# Patient Record
Sex: FEMALE | Race: WHITE | NOT HISPANIC OR LATINO | Employment: FULL TIME | URBAN - NONMETROPOLITAN AREA
[De-identification: names, ages, dates, MRNs, and addresses within clinical notes are randomized per-mention and may not be internally consistent; named-entity substitution may affect disease eponyms.]

---

## 2022-03-16 ENCOUNTER — OFFICE VISIT (OUTPATIENT)
Dept: FAMILY MEDICINE | Facility: CLINIC | Age: 42
End: 2022-03-16
Payer: COMMERCIAL

## 2022-03-16 VITALS
WEIGHT: 208 LBS | TEMPERATURE: 98 F | RESPIRATION RATE: 20 BRPM | OXYGEN SATURATION: 98 % | SYSTOLIC BLOOD PRESSURE: 120 MMHG | DIASTOLIC BLOOD PRESSURE: 87 MMHG | HEIGHT: 67 IN | BODY MASS INDEX: 32.65 KG/M2 | HEART RATE: 71 BPM

## 2022-03-16 DIAGNOSIS — H66.92 LEFT OTITIS MEDIA, UNSPECIFIED OTITIS MEDIA TYPE: ICD-10-CM

## 2022-03-16 DIAGNOSIS — R09.81 HEAD CONGESTION: ICD-10-CM

## 2022-03-16 DIAGNOSIS — B37.9 CANDIDA ALBICANS INFECTION: ICD-10-CM

## 2022-03-16 DIAGNOSIS — J32.1 FRONTAL SINUSITIS, UNSPECIFIED CHRONICITY: Primary | ICD-10-CM

## 2022-03-16 DIAGNOSIS — H93.8X3 CONGESTION OF BOTH EARS: ICD-10-CM

## 2022-03-16 PROCEDURE — 3074F PR MOST RECENT SYSTOLIC BLOOD PRESSURE < 130 MM HG: ICD-10-PCS | Mod: ,,, | Performed by: NURSE PRACTITIONER

## 2022-03-16 PROCEDURE — 1160F PR REVIEW ALL MEDS BY PRESCRIBER/CLIN PHARMACIST DOCUMENTED: ICD-10-PCS | Mod: ,,, | Performed by: NURSE PRACTITIONER

## 2022-03-16 PROCEDURE — 3074F SYST BP LT 130 MM HG: CPT | Mod: ,,, | Performed by: NURSE PRACTITIONER

## 2022-03-16 PROCEDURE — 3008F BODY MASS INDEX DOCD: CPT | Mod: ,,, | Performed by: NURSE PRACTITIONER

## 2022-03-16 PROCEDURE — 96372 THER/PROPH/DIAG INJ SC/IM: CPT | Mod: ,,, | Performed by: NURSE PRACTITIONER

## 2022-03-16 PROCEDURE — 96372 PR INJECTION,THERAP/PROPH/DIAG2ST, IM OR SUBCUT: ICD-10-PCS | Mod: ,,, | Performed by: NURSE PRACTITIONER

## 2022-03-16 PROCEDURE — 1160F RVW MEDS BY RX/DR IN RCRD: CPT | Mod: ,,, | Performed by: NURSE PRACTITIONER

## 2022-03-16 PROCEDURE — 3079F DIAST BP 80-89 MM HG: CPT | Mod: ,,, | Performed by: NURSE PRACTITIONER

## 2022-03-16 PROCEDURE — 1159F PR MEDICATION LIST DOCUMENTED IN MEDICAL RECORD: ICD-10-PCS | Mod: ,,, | Performed by: NURSE PRACTITIONER

## 2022-03-16 PROCEDURE — 1159F MED LIST DOCD IN RCRD: CPT | Mod: ,,, | Performed by: NURSE PRACTITIONER

## 2022-03-16 PROCEDURE — 99213 OFFICE O/P EST LOW 20 MIN: CPT | Mod: 25,,, | Performed by: NURSE PRACTITIONER

## 2022-03-16 PROCEDURE — 99213 PR OFFICE/OUTPT VISIT, EST, LEVL III, 20-29 MIN: ICD-10-PCS | Mod: 25,,, | Performed by: NURSE PRACTITIONER

## 2022-03-16 PROCEDURE — 3079F PR MOST RECENT DIASTOLIC BLOOD PRESSURE 80-89 MM HG: ICD-10-PCS | Mod: ,,, | Performed by: NURSE PRACTITIONER

## 2022-03-16 PROCEDURE — 3008F PR BODY MASS INDEX (BMI) DOCUMENTED: ICD-10-PCS | Mod: ,,, | Performed by: NURSE PRACTITIONER

## 2022-03-16 RX ORDER — PROMETHAZINE HYDROCHLORIDE 25 MG/1
25 TABLET ORAL
COMMUNITY
Start: 2021-10-15 | End: 2023-07-20

## 2022-03-16 RX ORDER — NYSTATIN 100000 [USP'U]/ML
6 SUSPENSION ORAL
Qty: 473 ML | Refills: 0 | Status: SHIPPED | OUTPATIENT
Start: 2022-03-16 | End: 2022-03-26

## 2022-03-16 RX ORDER — DEXAMETHASONE SODIUM PHOSPHATE 4 MG/ML
4 INJECTION, SOLUTION INTRA-ARTICULAR; INTRALESIONAL; INTRAMUSCULAR; INTRAVENOUS; SOFT TISSUE
Status: COMPLETED | OUTPATIENT
Start: 2022-03-16 | End: 2022-03-16

## 2022-03-16 RX ORDER — CYANOCOBALAMIN 1000 UG/ML
1000 INJECTION, SOLUTION INTRAMUSCULAR; SUBCUTANEOUS
COMMUNITY
Start: 2022-03-04 | End: 2023-07-20

## 2022-03-16 RX ORDER — ESTRADIOL 0.1 MG/D
1 FILM, EXTENDED RELEASE TRANSDERMAL
COMMUNITY
Start: 2022-03-11

## 2022-03-16 RX ORDER — CEFTRIAXONE 1 G/1
1 INJECTION, POWDER, FOR SOLUTION INTRAMUSCULAR; INTRAVENOUS
Status: COMPLETED | OUTPATIENT
Start: 2022-03-16 | End: 2022-03-16

## 2022-03-16 RX ORDER — PANTOPRAZOLE SODIUM 40 MG/1
40 TABLET, DELAYED RELEASE ORAL 2 TIMES DAILY
COMMUNITY
Start: 2022-03-11 | End: 2023-10-26 | Stop reason: SDUPTHER

## 2022-03-16 RX ORDER — CEFDINIR 300 MG/1
300 CAPSULE ORAL 2 TIMES DAILY
Qty: 20 CAPSULE | Refills: 0 | Status: SHIPPED | OUTPATIENT
Start: 2022-03-16 | End: 2022-03-26

## 2022-03-16 RX ADMIN — DEXAMETHASONE SODIUM PHOSPHATE 4 MG: 4 INJECTION, SOLUTION INTRA-ARTICULAR; INTRALESIONAL; INTRAMUSCULAR; INTRAVENOUS; SOFT TISSUE at 11:03

## 2022-03-16 RX ADMIN — CEFTRIAXONE 1 G: 1 INJECTION, POWDER, FOR SOLUTION INTRAMUSCULAR; INTRAVENOUS at 11:03

## 2022-03-16 NOTE — LETTER
March 16, 2022      [address]  [place] [address]  Phone: [phone]  Fax: [phone]       Patient: Suzan Hartman   YOB: 1980  Date of Visit: 03/16/2022    To Whom It May Concern:    CHRISTIE Hartman  was at [facility] on 03/16/2022. The patient may return to work/school on 03/17/2022 with no restrictions. If you have any questions or concerns, or if I can be of further assistance, please do not hesitate to contact me.    Sincerely,    KEI Amaro

## 2022-03-21 NOTE — PROGRESS NOTES
KEI Strange   [facility]  [address] [address] [address]  [phone]      PATIENT NAME: Suzan Hartman  : 1980  DATE: 3/16/22  MRN: [id number]      Billing Provider: KEI Strange  Level of Service:   Patient PCP Information     Provider PCP Type    KEI Strange General          Reason for Visit / Chief Complaint: Sinus Problem, Nasal Congestion, and Mouth Lesions (Been about a week)       Update PCP  Update Chief Complaint         History of Present Illness / Problem Focused Workflow       41 year old female presents with complaints of head and nasal congestion, sinus pressure, and a mouth lesion for about a week  Denies any fever, n/v    Review of Systems     Review of Systems   Constitutional: Negative for activity change and unexpected weight change.   HENT: Positive for rhinorrhea. Negative for hearing loss and trouble swallowing.    Eyes: Negative for discharge and visual disturbance.   Respiratory: Negative for chest tightness and wheezing.    Cardiovascular: Positive for palpitations. Negative for chest pain.   Gastrointestinal: Negative for constipation, diarrhea and vomiting.   Endocrine: Negative for polydipsia and polyuria.   Genitourinary: Negative for difficulty urinating, dysuria, hematuria and menstrual problem.   Musculoskeletal: Negative for arthralgias, joint swelling and neck pain.   Neurological: Negative for weakness and headaches.   Psychiatric/Behavioral: Negative for confusion and dysphoric mood.       Medical / Social / Family History   No past medical history on file.    No past surgical history on file.    Social History  MsLeda  reports that she has never smoked. She has never used smokeless tobacco. She reports that she does not drink alcohol and does not use drugs.    Family History  Ms.'s family history is not on file.    Medications and Allergies     Medications  Outpatient Medications Marked as Taking for  the 3/16/22 encounter (Office Visit) with KEI Strange   Medication Sig Dispense Refill    cyanocobalamin 1,000 mcg/mL injection Inject 1,000 mcg into the muscle every 30 days.      estradioL (VIVELLE-DOT) 0.1 mg/24 hr PTSW Place 1 patch onto the skin twice a week.      pantoprazole (PROTONIX) 40 MG tablet Take 40 mg by mouth 2 (two) times daily.      promethazine (PHENERGAN) 25 MG tablet Take 25 mg by mouth as needed.         Allergies  Review of patient's allergies indicates:   Allergen Reactions    Miralax [polyethylene glycol 3350]        Physical Examination     Vitals:    03/16/22 1110   BP: 120/87   Pulse: 71   Resp: 20   Temp: 97.7 °F (36.5 °C)     Physical Exam  Constitutional:       General: She is not in acute distress.  HENT:      Head: Normocephalic.      Ears:      Comments: Redness to bilat inner ear     Nose: Congestion present.      Mouth/Throat:      Mouth: Mucous membranes are moist.      Pharynx: Posterior oropharyngeal erythema present.   Eyes:      General:         Right eye: No discharge.         Left eye: No discharge.      Extraocular Movements: Extraocular movements intact.   Cardiovascular:      Rate and Rhythm: Normal rate.   Pulmonary:      Effort: Pulmonary effort is normal. No respiratory distress.   Abdominal:      General: Bowel sounds are normal.      Palpations: Abdomen is soft.   Musculoskeletal:         General: Normal range of motion.      Cervical back: Normal range of motion.   Skin:     General: Skin is warm.   Neurological:      Mental Status: She is alert and oriented to person, place, and time.   Psychiatric:         Behavior: Behavior normal.           Imaging / Labs     No visits with results within 1 Day(s) from this visit.   Latest known visit with results is:   No results found for any previous visit.     No image results found.      Assessment and Plan (including Health Maintenance)      Problem List  Smart Sets  Document Outside HM   :    Health  Maintenance Due   Topic Date Due    Hepatitis C Screening  Never done    Cervical Cancer Screening  Never done    Lipid Panel  Never done    HIV Screening  Never done    TETANUS VACCINE  Never done    Mammogram  Never done       Problem List Items Addressed This Visit        ENT    Frontal sinusitis - Primary    Relevant Medications    cefdinir (OMNICEF) 300 MG capsule    Left otitis media    Relevant Medications    cefdinir (OMNICEF) 300 MG capsule       ID    Candida albicans infection    Relevant Medications    nystatin (MYCOSTATIN) 100,000 unit/mL suspension      Other Visit Diagnoses     Congestion of both ears        Relevant Medications    dexamethasone injection 4 mg (Completed)    Head congestion        Relevant Medications    dexamethasone injection 4 mg (Completed)        Treat for sinusitis today. Nystatin s/s for mouth sore  Follow up if symptoms do not improve within 72 hours  Pt voices understanding and agreement    Signature:  KEI Strange  [address] [address]  [phone]    Date of encounter: 3/16/22

## 2022-04-18 ENCOUNTER — OFFICE VISIT (OUTPATIENT)
Dept: FAMILY MEDICINE | Facility: CLINIC | Age: 42
End: 2022-04-18
Payer: COMMERCIAL

## 2022-04-18 VITALS
BODY MASS INDEX: 32.96 KG/M2 | DIASTOLIC BLOOD PRESSURE: 70 MMHG | RESPIRATION RATE: 18 BRPM | HEART RATE: 88 BPM | TEMPERATURE: 98 F | OXYGEN SATURATION: 99 % | HEIGHT: 67 IN | SYSTOLIC BLOOD PRESSURE: 128 MMHG | WEIGHT: 210 LBS

## 2022-04-18 DIAGNOSIS — M54.50 ACUTE MIDLINE LOW BACK PAIN, UNSPECIFIED WHETHER SCIATICA PRESENT: ICD-10-CM

## 2022-04-18 DIAGNOSIS — R00.2 PALPITATIONS WITH REGULAR CARDIAC RHYTHM: ICD-10-CM

## 2022-04-18 DIAGNOSIS — R55 SYNCOPE AND COLLAPSE: ICD-10-CM

## 2022-04-18 DIAGNOSIS — B37.9 CANDIDA ALBICANS INFECTION: ICD-10-CM

## 2022-04-18 DIAGNOSIS — R55 SYNCOPE, UNSPECIFIED SYNCOPE TYPE: Primary | ICD-10-CM

## 2022-04-18 LAB
ALBUMIN SERPL BCP-MCNC: 3.7 G/DL (ref 3.5–5)
ALBUMIN/GLOB SERPL: 1.2 {RATIO}
ALP SERPL-CCNC: 74 U/L (ref 37–98)
ALT SERPL W P-5'-P-CCNC: 39 U/L (ref 13–56)
ANION GAP SERPL CALCULATED.3IONS-SCNC: 11 MMOL/L (ref 7–16)
AST SERPL W P-5'-P-CCNC: 23 U/L (ref 15–37)
BASOPHILS # BLD AUTO: 0.03 K/UL (ref 0–0.2)
BASOPHILS NFR BLD AUTO: 0.6 % (ref 0–1)
BILIRUB SERPL-MCNC: 0.7 MG/DL (ref 0–1.2)
BUN SERPL-MCNC: 11 MG/DL (ref 7–18)
BUN/CREAT SERPL: 11 (ref 6–20)
CALCIUM SERPL-MCNC: 8.8 MG/DL (ref 8.5–10.1)
CHLORIDE SERPL-SCNC: 107 MMOL/L (ref 98–107)
CO2 SERPL-SCNC: 26 MMOL/L (ref 21–32)
CREAT SERPL-MCNC: 0.96 MG/DL (ref 0.55–1.02)
DIFFERENTIAL METHOD BLD: ABNORMAL
EOSINOPHIL # BLD AUTO: 0.09 K/UL (ref 0–0.5)
EOSINOPHIL NFR BLD AUTO: 1.8 % (ref 1–4)
ERYTHROCYTE [DISTWIDTH] IN BLOOD BY AUTOMATED COUNT: 13.1 % (ref 11.5–14.5)
GLOBULIN SER-MCNC: 3 G/DL (ref 2–4)
GLUCOSE SERPL-MCNC: 101 MG/DL (ref 74–106)
HCT VFR BLD AUTO: 39 % (ref 38–47)
HGB BLD-MCNC: 12.8 G/DL (ref 12–16)
IMM GRANULOCYTES # BLD AUTO: 0.02 K/UL (ref 0–0.04)
IMM GRANULOCYTES NFR BLD: 0.4 % (ref 0–0.4)
LYMPHOCYTES # BLD AUTO: 1.27 K/UL (ref 1–4.8)
LYMPHOCYTES NFR BLD AUTO: 26.1 % (ref 27–41)
MCH RBC QN AUTO: 32.3 PG (ref 27–31)
MCHC RBC AUTO-ENTMCNC: 32.8 G/DL (ref 32–36)
MCV RBC AUTO: 98.5 FL (ref 80–96)
MONOCYTES # BLD AUTO: 0.45 K/UL (ref 0–0.8)
MONOCYTES NFR BLD AUTO: 9.2 % (ref 2–6)
MPC BLD CALC-MCNC: 11.2 FL (ref 9.4–12.4)
NEUTROPHILS # BLD AUTO: 3.01 K/UL (ref 1.8–7.7)
NEUTROPHILS NFR BLD AUTO: 61.9 % (ref 53–65)
NRBC # BLD AUTO: 0 X10E3/UL
NRBC, AUTO (.00): 0 %
PLATELET # BLD AUTO: 227 K/UL (ref 150–400)
POTASSIUM SERPL-SCNC: 4.3 MMOL/L (ref 3.5–5.1)
PROT SERPL-MCNC: 6.7 G/DL (ref 6.4–8.2)
RBC # BLD AUTO: 3.96 M/UL (ref 4.2–5.4)
SODIUM SERPL-SCNC: 140 MMOL/L (ref 136–145)
TSH SERPL DL<=0.005 MIU/L-ACNC: 1.58 UIU/ML (ref 0.36–3.74)
WBC # BLD AUTO: 4.87 K/UL (ref 4.5–11)

## 2022-04-18 PROCEDURE — 80050 CBC WITH DIFFERENTIAL: ICD-10-PCS | Mod: ICN,,, | Performed by: CLINICAL MEDICAL LABORATORY

## 2022-04-18 PROCEDURE — 99213 OFFICE O/P EST LOW 20 MIN: CPT | Mod: ,,, | Performed by: NURSE PRACTITIONER

## 2022-04-18 PROCEDURE — 1159F PR MEDICATION LIST DOCUMENTED IN MEDICAL RECORD: ICD-10-PCS | Mod: ,,, | Performed by: NURSE PRACTITIONER

## 2022-04-18 PROCEDURE — 3078F DIAST BP <80 MM HG: CPT | Mod: ,,, | Performed by: NURSE PRACTITIONER

## 2022-04-18 PROCEDURE — 80050 GENERAL HEALTH PANEL: CPT | Mod: ICN,,, | Performed by: CLINICAL MEDICAL LABORATORY

## 2022-04-18 PROCEDURE — 1160F PR REVIEW ALL MEDS BY PRESCRIBER/CLIN PHARMACIST DOCUMENTED: ICD-10-PCS | Mod: ,,, | Performed by: NURSE PRACTITIONER

## 2022-04-18 PROCEDURE — 3074F PR MOST RECENT SYSTOLIC BLOOD PRESSURE < 130 MM HG: ICD-10-PCS | Mod: ,,, | Performed by: NURSE PRACTITIONER

## 2022-04-18 PROCEDURE — 99213 PR OFFICE/OUTPT VISIT, EST, LEVL III, 20-29 MIN: ICD-10-PCS | Mod: ,,, | Performed by: NURSE PRACTITIONER

## 2022-04-18 PROCEDURE — 3008F PR BODY MASS INDEX (BMI) DOCUMENTED: ICD-10-PCS | Mod: ,,, | Performed by: NURSE PRACTITIONER

## 2022-04-18 PROCEDURE — 3078F PR MOST RECENT DIASTOLIC BLOOD PRESSURE < 80 MM HG: ICD-10-PCS | Mod: ,,, | Performed by: NURSE PRACTITIONER

## 2022-04-18 PROCEDURE — 3008F BODY MASS INDEX DOCD: CPT | Mod: ,,, | Performed by: NURSE PRACTITIONER

## 2022-04-18 PROCEDURE — 1159F MED LIST DOCD IN RCRD: CPT | Mod: ,,, | Performed by: NURSE PRACTITIONER

## 2022-04-18 PROCEDURE — 1160F RVW MEDS BY RX/DR IN RCRD: CPT | Mod: ,,, | Performed by: NURSE PRACTITIONER

## 2022-04-18 PROCEDURE — 3074F SYST BP LT 130 MM HG: CPT | Mod: ,,, | Performed by: NURSE PRACTITIONER

## 2022-04-18 RX ORDER — NYSTATIN 100000 [USP'U]/ML
6 SUSPENSION ORAL
Qty: 60 ML | Refills: 1 | Status: SHIPPED | OUTPATIENT
Start: 2022-04-18 | End: 2022-04-28

## 2022-04-18 RX ORDER — FLUCONAZOLE 150 MG/1
150 TABLET ORAL DAILY
Qty: 2 TABLET | Refills: 0 | Status: SHIPPED | OUTPATIENT
Start: 2022-04-18 | End: 2022-04-19

## 2022-04-18 RX ORDER — TIZANIDINE 4 MG/1
4 TABLET ORAL EVERY 8 HOURS
Qty: 30 TABLET | Refills: 0 | Status: SHIPPED | OUTPATIENT
Start: 2022-04-18 | End: 2022-04-28

## 2022-04-18 RX ORDER — LORAZEPAM 0.5 MG/1
0.5 TABLET ORAL DAILY PRN
COMMUNITY
Start: 2016-05-01 | End: 2023-12-15

## 2022-04-18 NOTE — LETTER
April 18, 2022      [address]  [place] [address]  Phone: [phone]  Fax: [phone]       Patient: Suzan Hartman   YOB: 1980  Date of Visit: 04/18/2022    To Whom It May Concern:    CHRISTIE Hartman  was at [facility] on 04/18/2022. The patient may return to work/school on 04/24/22 with no restrictions. If you have any questions or concerns, or if I can be of further assistance, please do not hesitate to contact me.    Sincerely,    Ayanna Avalos RN

## 2022-04-18 NOTE — LETTER
April 18, 2022      [address]  [place] [address]  Phone: [phone]  Fax: [phone]       Patient: Suzan Hartman   YOB: 1980  Date of Visit: 04/18/2022    To Whom It May Concern:    CHRISTIE Hartman  was at [facility] on 04/18/2022. The patient may return to work/school on 08/21/22 with no restrictions. If you have any questions or concerns, or if I can be of further assistance, please do not hesitate to contact me.    Sincerely,    Ayanna Avalos RN

## 2022-04-18 NOTE — PROGRESS NOTES
KEI Strange   [facility]  [address] [address] [address]  [phone]      PATIENT NAME: Suzan Hartman  : 1980  DATE: 22  MRN: [id number]      Billing Provider: KEI Strange  Level of Service:   Patient PCP Information     Provider PCP Type    KEI Strange General          Reason for Visit / Chief Complaint: Loss of Consciousness (States that she was sitting there getting her hair cut and got nauseous and then woke up on the floor. )       Update PCP  Update Chief Complaint         History of Present Illness / Problem Focused Workflow       41 year old female presents with complaints syncopal episode over the past weekend  Reports she was getting her hair done and has little memory of what actually happened  Hairdresser witnessed the event  States she has done this in the past several years ago while having a muscle spasm in neck  Reports she had no symptoms prior event that may have cause episode  Has complaints of lower back pain today; she thinks she may have injured while falling out of chair\    She does have family hx of DM of dad   family hx of thyroid disorder of mom and sister    Review of Systems     Review of Systems   Constitutional: Negative for activity change and unexpected weight change.   HENT: Negative for hearing loss and trouble swallowing.    Eyes: Negative for discharge and visual disturbance.   Respiratory: Negative for chest tightness and wheezing.    Cardiovascular: Positive for palpitations (history). Negative for chest pain.   Gastrointestinal: Negative for blood in stool, constipation, diarrhea and vomiting.   Endocrine: Negative for polydipsia and polyuria.   Genitourinary: Negative for difficulty urinating, dysuria, hematuria and menstrual problem.   Musculoskeletal: Positive for back pain. Negative for arthralgias, joint swelling and neck pain.   Neurological: Positive for syncope (about 2 days ago,  also had episode several years ago associated with muscle spasm of neck). Negative for dizziness, weakness and headaches.   Psychiatric/Behavioral: Negative for confusion and dysphoric mood.       Medical / Social / Family History   History reviewed. No pertinent past medical history.    History reviewed. No pertinent surgical history.    Social History  Ms.  reports that she has quit smoking. She has never used smokeless tobacco. She reports that she does not drink alcohol and does not use drugs.    Family History  Ms.'s family history includes Heart disease in her father and paternal grandfather; Hypertension in her father and mother.    Medications and Allergies     Medications  Outpatient Medications Marked as Taking for the 4/18/22 encounter (Office Visit) with KEI Strange   Medication Sig Dispense Refill    cyanocobalamin 1,000 mcg/mL injection Inject 1,000 mcg into the muscle every 30 days.      estradioL (VIVELLE-DOT) 0.1 mg/24 hr PTSW Place 1 patch onto the skin twice a week.      LORazepam (ATIVAN) 0.5 MG tablet Take 0.5 mg by mouth daily as needed.      pantoprazole (PROTONIX) 40 MG tablet Take 40 mg by mouth 2 (two) times daily.         Allergies  Review of patient's allergies indicates:   Allergen Reactions    Miralax [polyethylene glycol 3350]        Physical Examination     Vitals:    04/18/22 1404   BP: 128/70   Pulse: 88   Resp: 18   Temp: 98.1 °F (36.7 °C)     Physical Exam  Constitutional:       General: She is not in acute distress.  HENT:      Head: Normocephalic.      Ears:      Comments: Redness to bilat inner ear     Nose: Nose normal.      Mouth/Throat:      Mouth: Mucous membranes are moist.   Eyes:      General:         Right eye: No discharge.         Left eye: No discharge.      Extraocular Movements: Extraocular movements intact.   Cardiovascular:      Rate and Rhythm: Normal rate.   Pulmonary:      Effort: Pulmonary effort is normal. No respiratory distress.   Abdominal:       General: Bowel sounds are normal.      Palpations: Abdomen is soft.   Musculoskeletal:         General: Normal range of motion.      Cervical back: Normal range of motion.   Skin:     General: Skin is warm.   Neurological:      Mental Status: She is alert and oriented to person, place, and time.   Psychiatric:         Behavior: Behavior normal.           Imaging / Labs     Office Visit on 04/18/2022   Component Date Value Ref Range Status    Sodium 04/18/2022 140  136 - 145 mmol/L Final    Potassium 04/18/2022 4.3  3.5 - 5.1 mmol/L Final    Chloride 04/18/2022 107  98 - 107 mmol/L Final    CO2 04/18/2022 26  21 - 32 mmol/L Final    Anion Gap 04/18/2022 11  7 - 16 mmol/L Final    Glucose 04/18/2022 101  74 - 106 mg/dL Final    BUN 04/18/2022 11  7 - 18 mg/dL Final    Creatinine 04/18/2022 0.96  0.55 - 1.02 mg/dL Final    BUN/Creatinine Ratio 04/18/2022 11  6 - 20 Final    Calcium 04/18/2022 8.8  8.5 - 10.1 mg/dL Final    Total Protein 04/18/2022 6.7  6.4 - 8.2 g/dL Final    Albumin 04/18/2022 3.7  3.5 - 5.0 g/dL Final    Globulin 04/18/2022 3.0  2.0 - 4.0 g/dL Final    A/G Ratio 04/18/2022 1.2   Final    Bilirubin, Total 04/18/2022 0.7  0.0 - 1.2 mg/dL Final    Alk Phos 04/18/2022 74  37 - 98 U/L Final    ALT 04/18/2022 39  13 - 56 U/L Final    AST 04/18/2022 23  15 - 37 U/L Final    eGFR 04/18/2022 68  >=60 mL/min/1.73m² Final    TSH 04/18/2022 1.580  0.358 - 3.740 uIU/mL Final    WBC 04/18/2022 4.87  4.50 - 11.00 K/uL Final    RBC 04/18/2022 3.96 (A) 4.20 - 5.40 M/uL Final    Hemoglobin 04/18/2022 12.8  12.0 - 16.0 g/dL Final    Hematocrit 04/18/2022 39.0  38.0 - 47.0 % Final    MCV 04/18/2022 98.5 (A) 80.0 - 96.0 fL Final    MCH 04/18/2022 32.3 (A) 27.0 - 31.0 pg Final    MCHC 04/18/2022 32.8  32.0 - 36.0 g/dL Final    RDW 04/18/2022 13.1  11.5 - 14.5 % Final    Platelet Count 04/18/2022 227  150 - 400 K/uL Final    MPV 04/18/2022 11.2  9.4 - 12.4 fL Final    Neutrophils %  04/18/2022 61.9  53.0 - 65.0 % Final    Lymphocytes % 04/18/2022 26.1 (A) 27.0 - 41.0 % Final    Monocytes % 04/18/2022 9.2 (A) 2.0 - 6.0 % Final    Eosinophils % 04/18/2022 1.8  1.0 - 4.0 % Final    Basophils % 04/18/2022 0.6  0.0 - 1.0 % Final    Immature Granulocytes % 04/18/2022 0.4  0.0 - 0.4 % Final    nRBC, Auto 04/18/2022 0.0  <=0.0 % Final    Neutrophils, Abs 04/18/2022 3.01  1.80 - 7.70 K/uL Final    Lymphocytes, Absolute 04/18/2022 1.27  1.00 - 4.80 K/uL Final    Monocytes, Absolute 04/18/2022 0.45  0.00 - 0.80 K/uL Final    Eosinophils, Absolute 04/18/2022 0.09  0.00 - 0.50 K/uL Final    Basophils, Absolute 04/18/2022 0.03  0.00 - 0.20 K/uL Final    Immature Granulocytes, Absolute 04/18/2022 0.02  0.00 - 0.04 K/uL Final    nRBC, Absolute 04/18/2022 0.00  <=0.00 x10e3/uL Final    Diff Type 04/18/2022 Auto   Final     CT Head Without Contrast  Narrative: EXAMINATION:  CT HEAD WITHOUT CONTRAST    CLINICAL HISTORY:  Syncope and collapseSyncope, recurrent;    COMPARISON:  None    TECHNIQUE:  Multiple axial tomographic images of the brain were obtained without the use of intravenous contrast.    FINDINGS:  Midline structures are nondisplaced.  No convincing evidence of acute intracranial hemorrhage.  No convincing evidence of hydrocephalus.  Visualized paranasal sinuses and mastoid air cells are predominantly clear.  Impression: No convincing imaging evidence of acute intracranial abnormality.    The CT exam was performed using one or more of the following dose    reduction techniques- Automated exposure control, adjustment of the mA    and/or kV according to patient size, and/or use of iterative    reconstructed technique.    Point of Service: [facility]    Electronically signed by: Angel Agustin  Date:    04/19/2022  Time:    10:56      Assessment and Plan (including Health Maintenance)      Problem List  Smart Sets  Document Outside HM   :    Health Maintenance Due   Topic Date  Due    Hepatitis C Screening  Never done    Cervical Cancer Screening  Never done    Lipid Panel  Never done    HIV Screening  Never done    TETANUS VACCINE  Never done    Mammogram  Never done    Hemoglobin A1c  Never done       Problem List Items Addressed This Visit        ID    Candida albicans infection    Relevant Medications    fluconazole (DIFLUCAN) 150 MG Tab    nystatin (MYCOSTATIN) 100,000 unit/mL suspension      Other Visit Diagnoses     Syncope, unspecified syncope type    -  Primary    Relevant Orders    CBC Auto Differential (Completed)    Comprehensive Metabolic Panel (Completed)    TSH (Completed)    Syncope and collapse        Relevant Orders    CT Head Without Contrast (Completed)    Acute midline low back pain, unspecified whether sciatica present        Relevant Medications    tiZANidine (ZANAFLEX) 4 MG tablet        Will get baseline labs today and schedule CT since this as happened in the past  If CT is negative, will consider cardiology referral   Will call with result and treat as indicated  Follow up 2 weeks and prn      Signature:  KEI Strange  [facility]  [address] [place] [address] [place] [address]  [phone]    Date of encounter: 4/18/22

## 2022-04-19 ENCOUNTER — HOSPITAL ENCOUNTER (OUTPATIENT)
Dept: RADIOLOGY | Facility: HOSPITAL | Age: 42
Discharge: HOME | End: 2022-04-19
Attending: NURSE PRACTITIONER
Payer: COMMERCIAL

## 2022-04-19 DIAGNOSIS — R55 SYNCOPE AND COLLAPSE: ICD-10-CM

## 2022-04-19 PROCEDURE — 70450 CT HEAD/BRAIN W/O DYE: CPT | Mod: TC

## 2023-07-20 ENCOUNTER — OFFICE VISIT (OUTPATIENT)
Dept: FAMILY MEDICINE | Facility: CLINIC | Age: 43
End: 2023-07-20
Payer: COMMERCIAL

## 2023-07-20 VITALS
DIASTOLIC BLOOD PRESSURE: 91 MMHG | OXYGEN SATURATION: 98 % | RESPIRATION RATE: 18 BRPM | TEMPERATURE: 98 F | WEIGHT: 218.63 LBS | SYSTOLIC BLOOD PRESSURE: 125 MMHG | HEART RATE: 86 BPM | HEIGHT: 67 IN | BODY MASS INDEX: 34.31 KG/M2

## 2023-07-20 DIAGNOSIS — J01.40 ACUTE NON-RECURRENT PANSINUSITIS: Primary | ICD-10-CM

## 2023-07-20 DIAGNOSIS — R23.3 EASY BRUISING: ICD-10-CM

## 2023-07-20 DIAGNOSIS — R21 RASH: ICD-10-CM

## 2023-07-20 DIAGNOSIS — Z83.49 FAMILY HISTORY OF THYROID DISEASE: ICD-10-CM

## 2023-07-20 PROBLEM — J32.1 FRONTAL SINUSITIS: Status: RESOLVED | Noted: 2022-03-16 | Resolved: 2023-07-20

## 2023-07-20 PROBLEM — Z86.39 H/O NON ANEMIC VITAMIN B12 DEFICIENCY: Status: ACTIVE | Noted: 2023-07-20

## 2023-07-20 PROBLEM — H66.92 LEFT OTITIS MEDIA: Status: RESOLVED | Noted: 2022-03-16 | Resolved: 2023-07-20

## 2023-07-20 PROBLEM — K21.9 GERD (GASTROESOPHAGEAL REFLUX DISEASE): Status: ACTIVE | Noted: 2023-07-20

## 2023-07-20 PROBLEM — B37.9 CANDIDA ALBICANS INFECTION: Status: RESOLVED | Noted: 2022-03-16 | Resolved: 2023-07-20

## 2023-07-20 LAB
ALBUMIN SERPL BCP-MCNC: 4 G/DL (ref 3.5–5)
ALBUMIN/GLOB SERPL: 1 {RATIO}
ALP SERPL-CCNC: 66 U/L (ref 37–98)
ALT SERPL W P-5'-P-CCNC: 33 U/L (ref 13–56)
ANION GAP SERPL CALCULATED.3IONS-SCNC: 10 MMOL/L (ref 7–16)
AST SERPL W P-5'-P-CCNC: 14 U/L (ref 15–37)
BASOPHILS # BLD AUTO: 0.04 K/UL (ref 0–0.2)
BASOPHILS NFR BLD AUTO: 0.6 % (ref 0–1)
BILIRUB SERPL-MCNC: 1.3 MG/DL (ref ?–1.2)
BUN SERPL-MCNC: 11 MG/DL (ref 7–18)
BUN/CREAT SERPL: 10 (ref 6–20)
CALCIUM SERPL-MCNC: 9.7 MG/DL (ref 8.5–10.1)
CHLORIDE SERPL-SCNC: 104 MMOL/L (ref 98–107)
CO2 SERPL-SCNC: 28 MMOL/L (ref 21–32)
CREAT SERPL-MCNC: 1.15 MG/DL (ref 0.55–1.02)
DIFFERENTIAL METHOD BLD: ABNORMAL
EGFR (NO RACE VARIABLE) (RUSH/TITUS): 60 ML/MIN/1.73M2
EOSINOPHIL # BLD AUTO: 0.16 K/UL (ref 0–0.5)
EOSINOPHIL NFR BLD AUTO: 2.4 % (ref 1–4)
ERYTHROCYTE [DISTWIDTH] IN BLOOD BY AUTOMATED COUNT: 13.2 % (ref 11.5–14.5)
GLOBULIN SER-MCNC: 4 G/DL (ref 2–4)
GLUCOSE SERPL-MCNC: 103 MG/DL (ref 74–106)
HCT VFR BLD AUTO: 46.6 % (ref 38–47)
HGB BLD-MCNC: 15.1 G/DL (ref 12–16)
IMM GRANULOCYTES # BLD AUTO: 0.02 K/UL (ref 0–0.04)
IMM GRANULOCYTES NFR BLD: 0.3 % (ref 0–0.4)
INR BLD: 0.87
LYMPHOCYTES # BLD AUTO: 1.53 K/UL (ref 1–4.8)
LYMPHOCYTES NFR BLD AUTO: 22.7 % (ref 27–41)
MCH RBC QN AUTO: 31.7 PG (ref 27–31)
MCHC RBC AUTO-ENTMCNC: 32.4 G/DL (ref 32–36)
MCV RBC AUTO: 97.9 FL (ref 80–96)
MONOCYTES # BLD AUTO: 0.74 K/UL (ref 0–0.8)
MONOCYTES NFR BLD AUTO: 11 % (ref 2–6)
MPC BLD CALC-MCNC: 10.3 FL (ref 9.4–12.4)
NEUTROPHILS # BLD AUTO: 4.24 K/UL (ref 1.8–7.7)
NEUTROPHILS NFR BLD AUTO: 63 % (ref 53–65)
NRBC # BLD AUTO: 0 X10E3/UL
NRBC, AUTO (.00): 0 %
PLATELET # BLD AUTO: 234 K/UL (ref 150–400)
POTASSIUM SERPL-SCNC: 4.4 MMOL/L (ref 3.5–5.1)
PROT SERPL-MCNC: 8 G/DL (ref 6.4–8.2)
PROTHROMBIN TIME: 11.8 SECONDS (ref 11.7–14.7)
RBC # BLD AUTO: 4.76 M/UL (ref 4.2–5.4)
SODIUM SERPL-SCNC: 138 MMOL/L (ref 136–145)
TSH SERPL DL<=0.005 MIU/L-ACNC: 2.14 UIU/ML (ref 0.36–3.74)
WBC # BLD AUTO: 6.73 K/UL (ref 4.5–11)

## 2023-07-20 PROCEDURE — 80050 GENERAL HEALTH PANEL: CPT | Mod: ,,, | Performed by: CLINICAL MEDICAL LABORATORY

## 2023-07-20 PROCEDURE — 3074F PR MOST RECENT SYSTOLIC BLOOD PRESSURE < 130 MM HG: ICD-10-PCS | Mod: ,,, | Performed by: STUDENT IN AN ORGANIZED HEALTH CARE EDUCATION/TRAINING PROGRAM

## 2023-07-20 PROCEDURE — 1159F MED LIST DOCD IN RCRD: CPT | Mod: ,,, | Performed by: STUDENT IN AN ORGANIZED HEALTH CARE EDUCATION/TRAINING PROGRAM

## 2023-07-20 PROCEDURE — 99214 OFFICE O/P EST MOD 30 MIN: CPT | Mod: ,,, | Performed by: STUDENT IN AN ORGANIZED HEALTH CARE EDUCATION/TRAINING PROGRAM

## 2023-07-20 PROCEDURE — 3080F DIAST BP >= 90 MM HG: CPT | Mod: ,,, | Performed by: STUDENT IN AN ORGANIZED HEALTH CARE EDUCATION/TRAINING PROGRAM

## 2023-07-20 PROCEDURE — 80050 PR  GENERAL HEALTH PANEL: ICD-10-PCS | Mod: ,,, | Performed by: CLINICAL MEDICAL LABORATORY

## 2023-07-20 PROCEDURE — 99214 PR OFFICE/OUTPT VISIT, EST, LEVL IV, 30-39 MIN: ICD-10-PCS | Mod: ,,, | Performed by: STUDENT IN AN ORGANIZED HEALTH CARE EDUCATION/TRAINING PROGRAM

## 2023-07-20 PROCEDURE — 85610 PROTIME-INR: ICD-10-PCS | Mod: ,,, | Performed by: CLINICAL MEDICAL LABORATORY

## 2023-07-20 PROCEDURE — 1159F PR MEDICATION LIST DOCUMENTED IN MEDICAL RECORD: ICD-10-PCS | Mod: ,,, | Performed by: STUDENT IN AN ORGANIZED HEALTH CARE EDUCATION/TRAINING PROGRAM

## 2023-07-20 PROCEDURE — 3008F BODY MASS INDEX DOCD: CPT | Mod: ,,, | Performed by: STUDENT IN AN ORGANIZED HEALTH CARE EDUCATION/TRAINING PROGRAM

## 2023-07-20 PROCEDURE — 85610 PROTHROMBIN TIME: CPT | Mod: ,,, | Performed by: CLINICAL MEDICAL LABORATORY

## 2023-07-20 PROCEDURE — 3074F SYST BP LT 130 MM HG: CPT | Mod: ,,, | Performed by: STUDENT IN AN ORGANIZED HEALTH CARE EDUCATION/TRAINING PROGRAM

## 2023-07-20 PROCEDURE — 3008F PR BODY MASS INDEX (BMI) DOCUMENTED: ICD-10-PCS | Mod: ,,, | Performed by: STUDENT IN AN ORGANIZED HEALTH CARE EDUCATION/TRAINING PROGRAM

## 2023-07-20 PROCEDURE — 3080F PR MOST RECENT DIASTOLIC BLOOD PRESSURE >= 90 MM HG: ICD-10-PCS | Mod: ,,, | Performed by: STUDENT IN AN ORGANIZED HEALTH CARE EDUCATION/TRAINING PROGRAM

## 2023-07-20 RX ORDER — AMOXICILLIN AND CLAVULANATE POTASSIUM 875; 125 MG/1; MG/1
1 TABLET, FILM COATED ORAL EVERY 12 HOURS
Qty: 20 TABLET | Refills: 0 | Status: SHIPPED | OUTPATIENT
Start: 2023-07-20 | End: 2023-07-30

## 2023-07-20 RX ORDER — SULFAMETHOXAZOLE AND TRIMETHOPRIM 800; 160 MG/1; MG/1
1 TABLET ORAL 2 TIMES DAILY
COMMUNITY
Start: 2023-07-18 | End: 2023-07-20

## 2023-07-20 RX ORDER — METHYLPREDNISOLONE 4 MG/1
TABLET ORAL
COMMUNITY
Start: 2023-07-18 | End: 2023-07-20

## 2023-07-20 NOTE — LETTER
July 20, 2023      [facility] [address] [place] [address]  Phone: [phone]  Fax: [phone]       Patient: Suzan Hartman   YOB: 1980  Date of Visit: 07/20/2023    To Whom It May Concern:    CHRISTIE Hartman  was at [facility] on 07/20/2023. The patient may return to work/school on 7/21/2023 with no restrictions. If you have any questions or concerns, or if I can be of further assistance, please do not hesitate to contact me.    Sincerely,    Rona Galvan MD

## 2023-07-20 NOTE — PROGRESS NOTES
Progress Note     WENCESLAO RDZ MD   [address]  [place] [address]     PATIENT NAME: Suzan Hartman  : 1980  DATE: 23  MRN: [id number]      Billing Provider: WENCESLAO RDZ MD  Level of Service:   Patient PCP Information       Provider PCP Type    KEI Strange General                Allergic Reaction (Pt stated she wants to get her thyroid check while in clinic today/Pt states she saw a NP on yesterday and she prescribe her bactrim and she thinks she having a reaction./Pt stated the knots on both arms under the skin//Pt stated she have knots under her skin /Pt stated she took the bactrim on yesterday morning/) and Sinusitis (Pt stated symptoms started on Tuesday/)      SUBJECTIVE:     Suzan Hartman is a 43 y.o.female who presents to clinic for Allergic Reaction (Pt stated she wants to get her thyroid check while in clinic today/Pt states she saw a NP on yesterday and she prescribe her bactrim and she thinks she having a reaction./Pt stated the knots on both arms under the skin//Pt stated she have knots under her skin /Pt stated she took the bactrim on yesterday morning/) and Sinusitis (Pt stated symptoms started on Tuesday/)    Patient presents to clinic today for evaluation of new onset rash.  Patient was evaluated by a nurse practitioner earlier in the week and was diagnosed with sinusitis and given Bactrim to treat.  She notes she took 1 dose of Bactrim yesterday morning.  She subsequently developed a rash on her bilateral arms.  Patient has sunburn from earlier in the week.  She notes that the rash does not itch but it is uncomfortable.  She notes that some of the areas look like small bruises. Patient without any wheezing, tongue swelling/lip swelling, or SOB.     In regards to her sinus symptoms, symptoms started on Tuesday.  she notes runny nose, headache, sinus congestion, and thick/yellow nasal drainage.  She has sinus pain/pressure.  She has a  "fever last night.  She denies any fever today.  She denies shortness of breath but does have a mild cough due to drainage.  She routinely gets a yearly sinus infection.  Her home COVID test was negative x2.    Patient is also requesting a TSH as she has a significant family history of thyroid disease.    Past Medical History:  has no past medical history on file.   Past Surgical History:  has no past surgical history on file.  Family History: family history includes Heart disease in her father and paternal grandfather; Hypertension in her father and mother.  Social History:  reports that she has quit smoking. She has never used smokeless tobacco. She reports that she does not drink alcohol and does not use drugs.  Allergies:   Review of patient's allergies indicates:   Allergen Reactions    Bactrim [sulfamethoxazole-trimethoprim]     Miralax [polyethylene glycol 3350]          Current Outpatient Medications:     estradioL (VIVELLE-DOT) 0.1 mg/24 hr PTSW, Place 1 patch onto the skin twice a week., Disp: , Rfl:     LORazepam (ATIVAN) 0.5 MG tablet, Take 0.5 mg by mouth daily as needed., Disp: , Rfl:     pantoprazole (PROTONIX) 40 MG tablet, Take 40 mg by mouth 2 (two) times daily., Disp: , Rfl:     amoxicillin-clavulanate 875-125mg (AUGMENTIN) 875-125 mg per tablet, Take 1 tablet by mouth every 12 (twelve) hours. for 10 days, Disp: 20 tablet, Rfl: 0   OBJECTIVE:     Vital Signs   BP (!) 125/91 (BP Location: Left arm, Patient Position: Sitting, BP Method: Large (Automatic))   Pulse 86   Temp 98 °F (36.7 °C) (Temporal)   Resp 18   Ht 5' 7" (1.702 m)   Wt 99.2 kg (218 lb 9.6 oz)   SpO2 98%   BMI 34.24 kg/m²     Physical Exam  Constitutional:       General: She is not in acute distress.     Appearance: Normal appearance. She is not ill-appearing, toxic-appearing or diaphoretic.   HENT:      Head: Normocephalic and atraumatic.      Right Ear: Tympanic membrane normal.      Left Ear: Tympanic membrane normal.      " Nose: Congestion present. No rhinorrhea.      Right Sinus: No maxillary sinus tenderness or frontal sinus tenderness.      Left Sinus: No maxillary sinus tenderness or frontal sinus tenderness.      Mouth/Throat:      Mouth: Mucous membranes are moist.      Pharynx: No oropharyngeal exudate or posterior oropharyngeal erythema.   Eyes:      Extraocular Movements: Extraocular movements intact.      Pupils: Pupils are equal, round, and reactive to light.   Cardiovascular:      Rate and Rhythm: Normal rate and regular rhythm.      Pulses: Normal pulses.      Heart sounds: Normal heart sounds. No murmur heard.    No friction rub. No gallop.   Pulmonary:      Effort: Pulmonary effort is normal. No respiratory distress.      Breath sounds: No wheezing, rhonchi or rales.   Musculoskeletal:         General: Normal range of motion.      Cervical back: Normal range of motion.   Skin:     General: Skin is warm and dry.      Capillary Refill: Capillary refill takes less than 2 seconds.      Comments: Bilateral upper extremities with sunburn.  Patient also with overlying areas of discoloration possibly secondary to fixed drug eruption versus bruising.    Neurological:      General: No focal deficit present.      Mental Status: She is alert.   Psychiatric:         Mood and Affect: Mood normal.         Behavior: Behavior normal.       ASSESSMENT/PLAN:     1. Acute non-recurrent pansinusitis  -     amoxicillin-clavulanate 875-125mg (AUGMENTIN) 875-125 mg per tablet; Take 1 tablet by mouth every 12 (twelve) hours. for 10 days  Dispense: 20 tablet; Refill: 0  We will transition from Bactrim to Augmentin.  Patient counseled on medications she can use for symptoms including Zyrtec, Mucinex, Flonase, and Tylenol for discomfort.    2. Rash  -     CBC Auto Differential; Future; Expected date: 07/20/2023  -     Comprehensive Metabolic Panel; Future; Expected date: 07/20/2023  Patient with new onset rash is possibly secondary to drug  eruption secondary to exposure to Bactrim versus allergic reaction versus bruising.  Patient to monitor rash at home. Patient to discontinue Bactrim. We will obtain some routine blood work for further evaluation. Patient to Fu if rash worsens or fail to improve.     3. Easy bruising  -     Protime-INR; Future; Expected date: 07/20/2023  -     APTT; Future; Expected date: 07/20/2023    4. Family history of thyroid disease  -     TSH; Future; Expected date: 07/20/2023  TSH ordered per patient request.      Follow up in about 4 weeks (around 8/17/2023) for Recheck.      WENCESLAO RDZ MD  07/20/2023

## 2023-07-20 NOTE — PROGRESS NOTES
Health Maintenance Due   Topic Date Due    Hepatitis C Screening  Never done    Lipid Panel  Never done    COVID-19 Vaccine (1) Never done    HIV Screening  Never done    TETANUS VACCINE  Never done    Mammogram  Never done    Hemoglobin A1c (Diabetic Prevention Screening)  Never done      Discussed care gaps w/pt  Pt stated she doesn't want to schedule anything at this time.

## 2023-07-20 NOTE — LETTER
July 20, 2023      [facility] [address] [place] [address]  Phone: [phone]  Fax: [phone]       Patient: Suzan Hartman   YOB: 1980  Date of Visit: 07/20/2023    To Whom It May Concern:    CHRISTIE Hartman  was at [facility] on 07/20/2023. The patient may return to work/school on 7/20/2023 with no restrictions. If you have any questions or concerns, or if I can be of further assistance, please do not hesitate to contact me.    Sincerely,    Rona Galvan MD

## 2023-07-21 ENCOUNTER — PATIENT OUTREACH (OUTPATIENT)
Dept: ADMINISTRATIVE | Facility: HOSPITAL | Age: 43
End: 2023-07-21

## 2023-07-21 ENCOUNTER — PATIENT MESSAGE (OUTPATIENT)
Dept: ADMINISTRATIVE | Facility: HOSPITAL | Age: 43
End: 2023-07-21

## 2023-07-21 NOTE — PROGRESS NOTES
Pt needs appt for HY sent to [facility] to schedule via one note . Sent patient portal message regarding need for:  HY.

## 2023-07-21 NOTE — PROGRESS NOTES
Please call and let patient know that her blood work is reassuring. She does have a mildly elevated creatinine. She needs to increase her water intake. Her blood count and platelet count are normal. Her thyroid level is normal. If her rash does not improve, please have her call or RTC.

## 2023-07-25 ENCOUNTER — PATIENT OUTREACH (OUTPATIENT)
Dept: ADMINISTRATIVE | Facility: HOSPITAL | Age: 43
End: 2023-07-25

## 2023-07-25 NOTE — PROGRESS NOTES
07/25/2023   --Chart accessed for:Care Gaps  --Care Gaps addressed:A1c  Outreach made to patient via n/a . (Success) (Left Message) (Unavailable)   Patient stated w nurse she had A1c done back in 2022 w facility   Care Everywhere updates requested and reviewed.  Media reports reviewed.  [facility] and [facility] reviewed.  Immunization Database (Imm[employer]/MIXX) reviewed. Vaccinations uploaded: 2022 flu  Requested medical records from [facility] Due   Topic Date Due    Hepatitis C Screening  Never done    Lipid Panel  Never done    HIV Screening  Never done    TETANUS VACCINE  Never done    Mammogram  Never done    Hemoglobin A1c (Diabetic Prevention Screening)  Never done

## 2023-08-03 ENCOUNTER — PATIENT OUTREACH (OUTPATIENT)
Dept: ADMINISTRATIVE | Facility: HOSPITAL | Age: 43
End: 2023-08-03

## 2023-08-03 NOTE — PROGRESS NOTES
Pt stated she had labs done at [facility]. A request was sent, but they stated records do not exist for the patient. Patients needs to have blood work done. The document was scanned into media manager.

## 2023-10-26 ENCOUNTER — OFFICE VISIT (OUTPATIENT)
Dept: FAMILY MEDICINE | Facility: CLINIC | Age: 43
End: 2023-10-26
Payer: COMMERCIAL

## 2023-10-26 VITALS
OXYGEN SATURATION: 98 % | HEIGHT: 67 IN | TEMPERATURE: 98 F | WEIGHT: 225 LBS | DIASTOLIC BLOOD PRESSURE: 82 MMHG | RESPIRATION RATE: 18 BRPM | BODY MASS INDEX: 35.31 KG/M2 | SYSTOLIC BLOOD PRESSURE: 128 MMHG | HEART RATE: 90 BPM

## 2023-10-26 DIAGNOSIS — K44.9 HIATAL HERNIA: Primary | ICD-10-CM

## 2023-10-26 DIAGNOSIS — Z87.898 HISTORY OF SEVERE REACTION TO INFLUENZA VACCINE: ICD-10-CM

## 2023-10-26 DIAGNOSIS — K21.9 GASTROESOPHAGEAL REFLUX DISEASE, UNSPECIFIED WHETHER ESOPHAGITIS PRESENT: ICD-10-CM

## 2023-10-26 PROCEDURE — 3079F PR MOST RECENT DIASTOLIC BLOOD PRESSURE 80-89 MM HG: ICD-10-PCS | Mod: ,,, | Performed by: STUDENT IN AN ORGANIZED HEALTH CARE EDUCATION/TRAINING PROGRAM

## 2023-10-26 PROCEDURE — 99213 PR OFFICE/OUTPT VISIT, EST, LEVL III, 20-29 MIN: ICD-10-PCS | Mod: ,,, | Performed by: STUDENT IN AN ORGANIZED HEALTH CARE EDUCATION/TRAINING PROGRAM

## 2023-10-26 PROCEDURE — 99213 OFFICE O/P EST LOW 20 MIN: CPT | Mod: ,,, | Performed by: STUDENT IN AN ORGANIZED HEALTH CARE EDUCATION/TRAINING PROGRAM

## 2023-10-26 PROCEDURE — 3074F PR MOST RECENT SYSTOLIC BLOOD PRESSURE < 130 MM HG: ICD-10-PCS | Mod: ,,, | Performed by: STUDENT IN AN ORGANIZED HEALTH CARE EDUCATION/TRAINING PROGRAM

## 2023-10-26 PROCEDURE — 1159F MED LIST DOCD IN RCRD: CPT | Mod: ,,, | Performed by: STUDENT IN AN ORGANIZED HEALTH CARE EDUCATION/TRAINING PROGRAM

## 2023-10-26 PROCEDURE — 1159F PR MEDICATION LIST DOCUMENTED IN MEDICAL RECORD: ICD-10-PCS | Mod: ,,, | Performed by: STUDENT IN AN ORGANIZED HEALTH CARE EDUCATION/TRAINING PROGRAM

## 2023-10-26 PROCEDURE — 3074F SYST BP LT 130 MM HG: CPT | Mod: ,,, | Performed by: STUDENT IN AN ORGANIZED HEALTH CARE EDUCATION/TRAINING PROGRAM

## 2023-10-26 PROCEDURE — 3008F BODY MASS INDEX DOCD: CPT | Mod: ,,, | Performed by: STUDENT IN AN ORGANIZED HEALTH CARE EDUCATION/TRAINING PROGRAM

## 2023-10-26 PROCEDURE — 3079F DIAST BP 80-89 MM HG: CPT | Mod: ,,, | Performed by: STUDENT IN AN ORGANIZED HEALTH CARE EDUCATION/TRAINING PROGRAM

## 2023-10-26 PROCEDURE — 3008F PR BODY MASS INDEX (BMI) DOCUMENTED: ICD-10-PCS | Mod: ,,, | Performed by: STUDENT IN AN ORGANIZED HEALTH CARE EDUCATION/TRAINING PROGRAM

## 2023-10-26 RX ORDER — TRETINOIN 0.25 MG/G
CREAM TOPICAL
COMMUNITY
Start: 2023-07-19

## 2023-10-26 RX ORDER — PANTOPRAZOLE SODIUM 40 MG/1
40 TABLET, DELAYED RELEASE ORAL 2 TIMES DAILY
Qty: 180 TABLET | Refills: 3 | Status: SHIPPED | OUTPATIENT
Start: 2023-10-26

## 2023-10-26 RX ORDER — ALBUTEROL SULFATE 90 UG/1
2 AEROSOL, METERED RESPIRATORY (INHALATION) EVERY 4 HOURS PRN
COMMUNITY
Start: 2023-07-31

## 2023-10-26 RX ORDER — PANTOPRAZOLE SODIUM 40 MG/1
40 TABLET, DELAYED RELEASE ORAL 2 TIMES DAILY
Qty: 90 TABLET | Refills: 3 | Status: SHIPPED | OUTPATIENT
Start: 2023-10-26 | End: 2023-10-26

## 2023-10-26 NOTE — PROGRESS NOTES
Health Maintenance Due   Topic Date Due    Hepatitis C Screening  Never done    Lipid Panel  Never done    HIV Screening  Never done    TETANUS VACCINE  Never done    Mammogram  Never done    Hemoglobin A1c (Diabetic Prevention Screening)  Never done    Influenza Vaccine (1) 09/01/2023     Discussed care gaps with pt   Pt does not want any vaccines  Pt stated she will get screenings on next visit

## 2023-10-26 NOTE — LETTER
October 27, 2023      [facility] [address] [place] [address]  Phone: [phone]  Fax: [phone]       Patient: Suzan Hartman   YOB: 1980  Date of Visit: 10/27/2023    To Whom It May Concern:    CHRISTIE Hartman  was at [facility] on 10/27/2023. Dr. Galvan saw pt in clinic on Thursday 10/27/2023. Pt history included serious allergic reaction to past flu vaccinations. As a result, the flu vaccine is contraindicated. Patient meets criteria for medical exemption.     Sincerely,        Rona Galvan MD

## 2023-10-26 NOTE — PROGRESS NOTES
Progress Note     WENCESLAO RDZ MD   [address]  [place] [address]     PATIENT NAME: Suzan Hartman  : 1980  DATE: 10/26/23  MRN: [id number]      Billing Provider: WENCESLAO RDZ MD  Level of Service: [place] OFFICE/OUTPT VISIT, EST, LEVL III, 20-29 MIN  Patient PCP Information       Provider PCP Type    [facility] (Requesting medical exemption for employer r/t flu vaccine )      SUBJECTIVE:     Suzan Hartman is a 43 y.o.female who presents to clinic for Count includes the [facility] (Requesting medical exemption for employer r/t flu vaccine )    Patient presents to clinic today to have paperwork completed for flu vaccination exemption.  Patient states that last year when she got the flu vaccination she developed a significant rash up her arm into her neck and down her trunk.  She had associated nausea, vomiting, diarrhea.  She did not have any throat, tongue, or lip swelling.  Patient states she was ill for some time following vaccination.  She did not require hospitalization.    Patient also has a history of GERD and hiatal hernia.  She takes Protonix daily.  Patient is needing a refill.  She notes her symptoms are well-controlled with this medication.    All other pertinent review of systems negative. Please see HPI for details.     Past Medical History:  has no past medical history on file.   Past Surgical History:  has no past surgical history on file.  Family History: family history includes Heart disease in her father and paternal grandfather; Hypertension in her father and mother.  Social History:  reports that she has quit smoking. She has been exposed to tobacco smoke. She has never used smokeless tobacco. She reports that she does not drink alcohol and does not use drugs.  Allergies:   Review of patient's allergies indicates:   Allergen Reactions    Miralax [polyethylene glycol 3350] Anaphylaxis    Sumatriptan Anaphylaxis and  "Swelling    Bactrim [sulfamethoxazole-trimethoprim] Hives and Rash         Current Outpatient Medications:     albuterol (PROVENTIL/VENTOLIN HFA) 90 mcg/actuation inhaler, Inhale 2 puffs into the lungs every 4 (four) hours as needed., Disp: , Rfl:     estradioL (VIVELLE-DOT) 0.1 mg/24 hr PTSW, Place 1 patch onto the skin twice a week., Disp: , Rfl:     LORazepam (ATIVAN) 0.5 MG tablet, Take 0.5 mg by mouth daily as needed., Disp: , Rfl:     tretinoin (RETIN-A) 0.025 % cream, Apply to affected area IN THE EVENING DAILY, Disp: , Rfl:     pantoprazole (PROTONIX) 40 MG tablet, Take 1 tablet (40 mg total) by mouth 2 (two) times daily., Disp: 90 tablet, Rfl: 3   OBJECTIVE:     Vital Signs   /82 (BP Location: Left arm, Patient Position: Sitting, BP Method: Large (Automatic))   Pulse 90   Temp 98.2 °F (36.8 °C) (Temporal)   Resp 18   Ht 5' 7" (1.702 m)   Wt 102.1 kg (225 lb)   SpO2 98%   BMI 35.24 kg/m²     Physical Exam  Constitutional:       General: She is not in acute distress.     Appearance: Normal appearance. She is not ill-appearing, toxic-appearing or diaphoretic.   HENT:      Head: Normocephalic and atraumatic.   Eyes:      Extraocular Movements: Extraocular movements intact.      Pupils: Pupils are equal, round, and reactive to light.   Cardiovascular:      Rate and Rhythm: Normal rate and regular rhythm.      Pulses: Normal pulses.      Heart sounds: Normal heart sounds. No murmur heard.     No friction rub. No gallop.   Pulmonary:      Effort: Pulmonary effort is normal. No respiratory distress.      Breath sounds: No wheezing, rhonchi or rales.   Abdominal:      General: Abdomen is flat.      Palpations: Abdomen is soft.      Tenderness: There is no abdominal tenderness. There is no guarding or rebound.   Musculoskeletal:         General: Normal range of motion.      Cervical back: Normal range of motion.   Skin:     General: Skin is warm and dry.      Capillary Refill: Capillary refill takes less " than 2 seconds.   Neurological:      General: No focal deficit present.      Mental Status: She is alert.   Psychiatric:         Mood and Affect: Mood normal.         Behavior: Behavior normal.         ASSESSMENT/PLAN:     1. Hiatal hernia  -     pantoprazole (PROTONIX) 40 MG tablet; Take 1 tablet (40 mg total) by mouth 2 (two) times daily.  Dispense: 180 tablet; Refill: 3    2. Gastroesophageal reflux disease, unspecified whether esophagitis present  -     pantoprazole (PROTONIX) 40 MG tablet; Take 1 tablet (40 mg total) by mouth 2 (two) times daily.  Dispense: 180 tablet; Refill: 3    Patient with history of hiatal hernia and GERD.  Symptoms well controlled with Protonix.  Continue medication.  Refill for Protonix provided.    3. History of severe reaction to influenza vaccine    Patient with history of significant reaction to influenza vaccination.  Form completed for vaccination exemption.      Follow up in about 6 months (around 4/26/2024).      WENCESLAO RDZ MD  10/26/2023    Due to voice recognition software, sound alike and misspelled words may be contained in the documentation.

## 2023-10-26 NOTE — LETTER
October 27, 2023      [facility] [address] [place] [address]  Phone: [phone]  Fax: [phone]       Patient: Suzan Hartman   YOB: 1980  Date of Visit: 10/27/2023    To Whom It May Concern:    CHRISTIE Hartman  was at [facility] on 10/27/2023. Dr. Galvan saw pt in clinic on Thursday 10/27/2023. Pt history included serious allergic reaction to past flu vaccinations. As a result, the flu vaccine is contraindicated. Patient meets criteria for medical exemption.     Sincerely,    Vanessa Mccoy LPN

## 2023-10-27 ENCOUNTER — PATIENT MESSAGE (OUTPATIENT)
Dept: FAMILY MEDICINE | Facility: CLINIC | Age: 43
End: 2023-10-27
Payer: COMMERCIAL

## 2023-12-15 ENCOUNTER — OFFICE VISIT (OUTPATIENT)
Dept: FAMILY MEDICINE | Facility: CLINIC | Age: 43
End: 2023-12-15
Payer: COMMERCIAL

## 2023-12-15 VITALS
BODY MASS INDEX: 36.2 KG/M2 | WEIGHT: 230.63 LBS | SYSTOLIC BLOOD PRESSURE: 124 MMHG | HEART RATE: 97 BPM | OXYGEN SATURATION: 98 % | DIASTOLIC BLOOD PRESSURE: 80 MMHG | TEMPERATURE: 98 F | HEIGHT: 67 IN | RESPIRATION RATE: 18 BRPM

## 2023-12-15 DIAGNOSIS — J02.9 SORE THROAT: ICD-10-CM

## 2023-12-15 DIAGNOSIS — J01.40 ACUTE NON-RECURRENT PANSINUSITIS: Primary | ICD-10-CM

## 2023-12-15 DIAGNOSIS — R09.81 HEAD CONGESTION: ICD-10-CM

## 2023-12-15 LAB
CTP QC/QA: YES
CTP QC/QA: YES
FLUAV AG NPH QL: NEGATIVE
FLUBV AG NPH QL: NEGATIVE
S PYO RRNA THROAT QL PROBE: NEGATIVE
SARS-COV-2 AG RESP QL IA.RAPID: NEGATIVE

## 2023-12-15 PROCEDURE — 87880 STREP A ASSAY W/OPTIC: CPT | Mod: QW,,, | Performed by: STUDENT IN AN ORGANIZED HEALTH CARE EDUCATION/TRAINING PROGRAM

## 2023-12-15 PROCEDURE — 3074F SYST BP LT 130 MM HG: CPT | Mod: ,,, | Performed by: STUDENT IN AN ORGANIZED HEALTH CARE EDUCATION/TRAINING PROGRAM

## 2023-12-15 PROCEDURE — 1159F PR MEDICATION LIST DOCUMENTED IN MEDICAL RECORD: ICD-10-PCS | Mod: ,,, | Performed by: STUDENT IN AN ORGANIZED HEALTH CARE EDUCATION/TRAINING PROGRAM

## 2023-12-15 PROCEDURE — 3079F PR MOST RECENT DIASTOLIC BLOOD PRESSURE 80-89 MM HG: ICD-10-PCS | Mod: ,,, | Performed by: STUDENT IN AN ORGANIZED HEALTH CARE EDUCATION/TRAINING PROGRAM

## 2023-12-15 PROCEDURE — 99213 PR OFFICE/OUTPT VISIT, EST, LEVL III, 20-29 MIN: ICD-10-PCS | Mod: ,,, | Performed by: STUDENT IN AN ORGANIZED HEALTH CARE EDUCATION/TRAINING PROGRAM

## 2023-12-15 PROCEDURE — 99213 OFFICE O/P EST LOW 20 MIN: CPT | Mod: ,,, | Performed by: STUDENT IN AN ORGANIZED HEALTH CARE EDUCATION/TRAINING PROGRAM

## 2023-12-15 PROCEDURE — 1159F MED LIST DOCD IN RCRD: CPT | Mod: ,,, | Performed by: STUDENT IN AN ORGANIZED HEALTH CARE EDUCATION/TRAINING PROGRAM

## 2023-12-15 PROCEDURE — 3079F DIAST BP 80-89 MM HG: CPT | Mod: ,,, | Performed by: STUDENT IN AN ORGANIZED HEALTH CARE EDUCATION/TRAINING PROGRAM

## 2023-12-15 PROCEDURE — 87880 POCT RAPID STREP A: ICD-10-PCS | Mod: QW,,, | Performed by: STUDENT IN AN ORGANIZED HEALTH CARE EDUCATION/TRAINING PROGRAM

## 2023-12-15 PROCEDURE — 87428 SARSCOV & INF VIR A&B AG IA: CPT | Mod: QW,,, | Performed by: STUDENT IN AN ORGANIZED HEALTH CARE EDUCATION/TRAINING PROGRAM

## 2023-12-15 PROCEDURE — 3008F BODY MASS INDEX DOCD: CPT | Mod: ,,, | Performed by: STUDENT IN AN ORGANIZED HEALTH CARE EDUCATION/TRAINING PROGRAM

## 2023-12-15 PROCEDURE — 3074F PR MOST RECENT SYSTOLIC BLOOD PRESSURE < 130 MM HG: ICD-10-PCS | Mod: ,,, | Performed by: STUDENT IN AN ORGANIZED HEALTH CARE EDUCATION/TRAINING PROGRAM

## 2023-12-15 PROCEDURE — 87428 POCT SARS-COV2 (COVID) WITH FLU ANTIGEN: ICD-10-PCS | Mod: QW,,, | Performed by: STUDENT IN AN ORGANIZED HEALTH CARE EDUCATION/TRAINING PROGRAM

## 2023-12-15 PROCEDURE — 3008F PR BODY MASS INDEX (BMI) DOCUMENTED: ICD-10-PCS | Mod: ,,, | Performed by: STUDENT IN AN ORGANIZED HEALTH CARE EDUCATION/TRAINING PROGRAM

## 2023-12-15 RX ORDER — AMOXICILLIN AND CLAVULANATE POTASSIUM 875; 125 MG/1; MG/1
1 TABLET, FILM COATED ORAL EVERY 12 HOURS
Qty: 20 TABLET | Refills: 0 | Status: SHIPPED | OUTPATIENT
Start: 2023-12-15 | End: 2023-12-25

## 2023-12-15 RX ORDER — FLUCONAZOLE 150 MG/1
TABLET ORAL
COMMUNITY
Start: 2023-11-13 | End: 2023-12-15 | Stop reason: SDUPTHER

## 2023-12-15 RX ORDER — BENZONATATE 100 MG/1
100 CAPSULE ORAL 3 TIMES DAILY PRN
Qty: 30 CAPSULE | Refills: 0 | Status: SHIPPED | OUTPATIENT
Start: 2023-12-15 | End: 2023-12-25

## 2023-12-15 RX ORDER — FLUCONAZOLE 150 MG/1
TABLET ORAL
Qty: 2 TABLET | Refills: 0 | Status: SHIPPED | OUTPATIENT
Start: 2023-12-15

## 2023-12-15 RX ORDER — GUAIFENESIN 600 MG/1
1200 TABLET, EXTENDED RELEASE ORAL 2 TIMES DAILY
Qty: 40 TABLET | Refills: 0 | Status: SHIPPED | OUTPATIENT
Start: 2023-12-15 | End: 2023-12-25

## 2023-12-15 RX ORDER — IPRATROPIUM BROMIDE 21 UG/1
2 SPRAY, METERED NASAL 2 TIMES DAILY
Qty: 30 ML | Refills: 0 | Status: SHIPPED | OUTPATIENT
Start: 2023-12-15

## 2023-12-15 NOTE — PROGRESS NOTES
Progress Note     WENCESLAO RDZ MD   [address]  [place] [address]     PATIENT NAME: Suzan Hartman  : 1980  DATE: 12/15/23  MRN: [id number]      Billing Provider: WENCESLAO RDZ MD  Level of Service: [place] OFFICE/OUTPT VISIT, EST, LEVL III, 20-29 MIN  Patient PCP Information       Provider PCP Type    [facility]                Sore Throat, Nasal Congestion, and Cough (All symptoms started Wednesday )      SUBJECTIVE:     Suzan Hartman is a 43 y.o.female who presents to clinic for Sore Throat, Nasal Congestion, and Cough (All symptoms started Wednesday )    Patient presents to clinic today with URI symptoms.  Patient with onset of symptoms on Wednesday.  Patient with nasal congestion, sore throat, sinus pain/pressure, and cough.  Patient notes that she is now having purulent nasal drainage.  She feels fatigued.  She denies fever body aches.  Notes that she was having some tender lymph nodes in her neck as well.  She has coughing a good bit but denies any shortness a breath.  She history of asthma but does use in inhaler periodically when she was like she was having tightness in her chest or wheezing.  She will occasionally wheezing with URI symptoms.  She denies any wheezing at this time.  She is eating and drinking without difficulty.  She was having normal urination and bowel movements.    Patient gets yearly mammograms at [facility].  Patient last had mammogram 2022.  Patient is scheduled for mammogram early next year.    All other pertinent review of systems negative. Please see HPI for details.     Past Medical History:  has no past medical history on file.   Past Surgical History:  has no past surgical history on file.  Family History: family history includes Heart disease in her father and paternal grandfather; Hypertension in her father and mother.  Social History:  reports that she has quit smoking. She has been exposed to tobacco  "smoke. She has never used smokeless tobacco. She reports that she does not drink alcohol and does not use drugs.  Allergies:   Review of patient's allergies indicates:   Allergen Reactions    Miralax [polyethylene glycol 3350] Anaphylaxis    Sumatriptan Anaphylaxis and Swelling    Bactrim [sulfamethoxazole-trimethoprim] Hives and Rash         Current Outpatient Medications:     albuterol (PROVENTIL/VENTOLIN HFA) 90 mcg/actuation inhaler, Inhale 2 puffs into the lungs every 4 (four) hours as needed., Disp: , Rfl:     estradioL (VIVELLE-DOT) 0.1 mg/24 hr PTSW, Place 1 patch onto the skin twice a week., Disp: , Rfl:     pantoprazole (PROTONIX) 40 MG tablet, Take 1 tablet (40 mg total) by mouth 2 (two) times daily., Disp: 180 tablet, Rfl: 3    tretinoin (RETIN-A) 0.025 % cream, Apply to affected area IN THE EVENING DAILY, Disp: , Rfl:     amoxicillin-clavulanate 875-125mg (AUGMENTIN) 875-125 mg per tablet, Take 1 tablet by mouth every 12 (twelve) hours. for 10 days, Disp: 20 tablet, Rfl: 0    benzonatate (TESSALON) 100 MG capsule, Take 1 capsule (100 mg total) by mouth 3 (three) times daily as needed for Cough., Disp: 30 capsule, Rfl: 0    fluconazole (DIFLUCAN) 150 MG Tab, SMARTSI Tablet(s) By Mouth Every 3 Days PRN, Disp: 2 tablet, Rfl: 0    guaiFENesin (MUCINEX) 600 mg 12 hr tablet, Take 2 tablets (1,200 mg total) by mouth 2 (two) times daily. for 10 days, Disp: 40 tablet, Rfl: 0    ipratropium (ATROVENT) 21 mcg (0.03 %) nasal spray, 2 sprays by Each Nostril route 2 (two) times daily., Disp: 30 mL, Rfl: 0    LORazepam (ATIVAN) 0.5 MG tablet, Take 0.5 mg by mouth daily as needed., Disp: , Rfl:    OBJECTIVE:     Vital Signs   /80   Pulse 97   Temp 97.9 °F (36.6 °C)   Resp 18   Ht 5' 7" (1.702 m)   Wt 104.6 kg (230 lb 9.6 oz)   SpO2 98%   BMI 36.12 kg/m²     Physical Exam  Constitutional:       General: She is not in acute distress.     Appearance: Normal appearance. She is not ill-appearing, " toxic-appearing or diaphoretic.   HENT:      Head: Normocephalic and atraumatic.      Right Ear: Tympanic membrane normal.      Left Ear: Tympanic membrane normal.      Nose: Congestion present. No rhinorrhea.      Mouth/Throat:      Mouth: Mucous membranes are moist.      Pharynx: Posterior oropharyngeal erythema (Mild with cobblestoning) present. No oropharyngeal exudate.   Eyes:      Extraocular Movements: Extraocular movements intact.      Pupils: Pupils are equal, round, and reactive to light.   Cardiovascular:      Rate and Rhythm: Normal rate and regular rhythm.      Pulses: Normal pulses.      Heart sounds: Normal heart sounds. No murmur heard.     No friction rub. No gallop.   Pulmonary:      Effort: Pulmonary effort is normal. No respiratory distress.      Breath sounds: No wheezing, rhonchi or rales.   Abdominal:      General: Abdomen is flat.      Palpations: Abdomen is soft.      Tenderness: There is no abdominal tenderness. There is no guarding or rebound.   Musculoskeletal:         General: Normal range of motion.      Cervical back: Normal range of motion.   Skin:     General: Skin is warm and dry.      Capillary Refill: Capillary refill takes less than 2 seconds.   Neurological:      General: No focal deficit present.      Mental Status: She is alert.   Psychiatric:         Mood and Affect: Mood normal.         Behavior: Behavior normal.         ASSESSMENT/PLAN:     1. Acute non-recurrent pansinusitis  -     benzonatate (TESSALON) 100 MG capsule; Take 1 capsule (100 mg total) by mouth 3 (three) times daily as needed for Cough.  Dispense: 30 capsule; Refill: 0  -     guaiFENesin (MUCINEX) 600 mg 12 hr tablet; Take 2 tablets (1,200 mg total) by mouth 2 (two) times daily. for 10 days  Dispense: 40 tablet; Refill: 0  -     ipratropium (ATROVENT) 21 mcg (0.03 %) nasal spray; 2 sprays by Each Nostril route 2 (two) times daily.  Dispense: 30 mL; Refill: 0  -     amoxicillin-clavulanate 875-125mg  (AUGMENTIN) 875-125 mg per tablet; Take 1 tablet by mouth every 12 (twelve) hours. for 10 days  Dispense: 20 tablet; Refill: 0    2. Sore throat  -     POCT rapid strep A    3. Head congestion  -     POCT SARS-COV2 (COVID) with Flu Antigen    Other orders  -     fluconazole (DIFLUCAN) 150 MG Tab; SMARTSI Tablet(s) By Mouth Every 3 Days PRN  Dispense: 2 tablet; Refill: 0    Symptoms concerning for sinusitis.  Patient has a for flu, COVID, and strep and was negative for all 3.  We will provide Tessalon Perles, Mucinex, and Atrovent nasal spray.  Patient without any wheezing on exam.  Patient notes steroids do cause some palpitations.  We will avoid at this time.  Patient may use albuterol if she develops wheezing.  She has prescription at home.  We will provide Augmentin prescription for patient to take if her symptoms persist over 7 days or she develops worsening symptoms.  We will also provide Diflucan given concern she may develop a yeast infection.    No follow-ups on file.      WENCESLAO RDZ MD  12/15/2023    Due to voice recognition software, sound alike and misspelled words may be contained in the documentation.

## 2023-12-15 NOTE — LETTER
December 15, 2023      [facility] [address] [place] [address]  Phone: [phone]  Fax: [phone]       Patient: Suzan Hartman   YOB: 1980  Date of Visit: 12/15/2023    To Whom It May Concern:    CHRISTIE Hartman  was at [facility] on 12/15/2023. The patient may return to work/school on 12/18 with no restrictions. If you have any questions or concerns, or if I can be of further assistance, please do not hesitate to contact me.    Sincerely,    Rona Galvan MD

## 2023-12-15 NOTE — PROGRESS NOTES
Health Maintenance Due   Topic Date Due    Hepatitis C Screening  Never done    Lipid Panel  Never done    HIV Screening  Never done    TETANUS VACCINE  Never done    Mammogram  Never done    Hemoglobin A1c (Diabetic Prevention Screening)  Never done    Influenza Vaccine (1) 09/01/2023     Discussed care gaps with pt  Pt not interested in nay vaccines or screenings today